# Patient Record
Sex: FEMALE | Race: BLACK OR AFRICAN AMERICAN | NOT HISPANIC OR LATINO | Employment: OTHER | URBAN - METROPOLITAN AREA
[De-identification: names, ages, dates, MRNs, and addresses within clinical notes are randomized per-mention and may not be internally consistent; named-entity substitution may affect disease eponyms.]

---

## 2021-05-12 ENCOUNTER — PATIENT MESSAGE (OUTPATIENT)
Dept: RESEARCH | Facility: HOSPITAL | Age: 71
End: 2021-05-12

## 2022-07-11 PROBLEM — M25.552 LEFT HIP PAIN: Status: ACTIVE | Noted: 2022-07-11

## 2022-07-25 PROBLEM — M19.90 OSTEOARTHRITIS: Status: ACTIVE | Noted: 2022-07-25

## 2022-07-25 PROBLEM — I10 PRIMARY HYPERTENSION: Status: ACTIVE | Noted: 2022-07-25

## 2022-07-25 PROBLEM — Z78.0 ENCOUNTER FOR OSTEOPOROSIS SCREENING IN ASYMPTOMATIC POSTMENOPAUSAL PATIENT: Status: ACTIVE | Noted: 2022-07-25

## 2022-07-25 PROBLEM — Z13.820 OSTEOPOROSIS SCREENING: Status: ACTIVE | Noted: 2022-07-25

## 2022-10-24 PROBLEM — Z78.0 ENCOUNTER FOR OSTEOPOROSIS SCREENING IN ASYMPTOMATIC POSTMENOPAUSAL PATIENT: Status: RESOLVED | Noted: 2022-07-25 | Resolved: 2022-10-24

## 2022-10-24 PROBLEM — Z13.820 ENCOUNTER FOR OSTEOPOROSIS SCREENING IN ASYMPTOMATIC POSTMENOPAUSAL PATIENT: Status: RESOLVED | Noted: 2022-07-25 | Resolved: 2022-10-24

## 2022-12-06 PROBLEM — N32.81 OAB (OVERACTIVE BLADDER): Status: ACTIVE | Noted: 2022-12-06

## 2022-12-08 ENCOUNTER — PATIENT OUTREACH (OUTPATIENT)
Dept: ADMINISTRATIVE | Facility: OTHER | Age: 72
End: 2022-12-08

## 2022-12-08 NOTE — PROGRESS NOTES
CHW - Initial Contact    This Community Health Worker completed the Social Determinant of Health questionnaire with patient via telephone today.    Pt identified barriers of most importance are: Patient identified barrier of importance pain she is having but feeling better   Since medication was given    Referrals to community agencies completed with patient consent outside of [facility] include: No community referral was not needed during phone interview  Referrals were put through [facility] - no:   Support and Services: no support services needed during phone interview   Other information discussed the patient needs  help with: Patient discussed no other information during phone interview   Follow up required: Yes  Follow-up Outreach - Due: 12/29/2022

## 2023-01-03 ENCOUNTER — PATIENT OUTREACH (OUTPATIENT)
Dept: ADMINISTRATIVE | Facility: OTHER | Age: 73
End: 2023-01-03

## 2023-01-03 NOTE — PROGRESS NOTES
CHW - Follow Up    This Community Health Worker completed a follow up visit with patient via telephone today.  Pt reported: patient reported she is much better and has nothing else to report via phone interivew  Community Health Worker provided: CHW spoke with patient and patient is much better and has nothing to report via phone interiview  Follow up required: no  No future outreach task assigned     CHW - Case Closure    This Community Health Worker spoke to patient via telephone today.   Pt reported: patient has nothing to report during phone interview she is much better  Pt denied any additional needs at this time and agrees with episode closure at this time.  Provided caregiver with Community Health Worker's contact information and encouraged her to contact this Community Health Worker if additional needs arise.

## 2025-01-16 ENCOUNTER — PATIENT OUTREACH (OUTPATIENT)
Dept: ADMINISTRATIVE | Facility: OTHER | Age: 75
End: 2025-01-16

## 2025-01-16 PROBLEM — M06.9 RHEUMATOID ARTHRITIS, INVOLVING UNSPECIFIED SITE, UNSPECIFIED WHETHER RHEUMATOID FACTOR PRESENT: Status: ACTIVE | Noted: 2025-01-16

## 2025-01-16 NOTE — PROGRESS NOTES
CHW - Initial Contact    This Community Health Worker completed the Social Determinant of Health questionnaire with patient during clinic visit today.    Pt identified barriers of most importance are: patient identified no barriers of importance during clinic visit   Referrals to community agencies completed with patient consent outside of [facility] include: No community referral needed during   Clinic visit   Referrals were put through [facility] - no:   Support and Services: No support services needed during clinic visit  Other information discussed the patient needs help with: patient discussed no other information during clinic visit    Follow up required: yes  Follow-up Outreach - Due: 1/30/2025

## 2025-01-28 ENCOUNTER — PATIENT OUTREACH (OUTPATIENT)
Dept: ADMINISTRATIVE | Facility: OTHER | Age: 75
End: 2025-01-28

## 2025-01-28 NOTE — PROGRESS NOTES
CHW - Follow Up    This Community Health Worker completed a follow up visit with patient during clinic visit today.  Pt reported: patient reported she is doing alright no assistance is needed during clinic follow up   Patient will reach out if assistance is needed in the future.   Community Health Worker provided: patient reported she is doing alright.  Follow up required: no  Case Closure - Due: 1/28/2025             CHW - Case Closure    This Community Health Worker spoke to patient during clinic visit today.   Pt reported: patient reported she is doing alright no assistance is needed during clinic follow up   Patient will reach out if assistance is needed in the future.   Pt denied any additional needs at this time and agrees with episode closure at this time.    Provided patient with Community Health Worker's contact information and encouraged  her to contact this Community Health Worker if additional needs arise.